# Patient Record
(demographics unavailable — no encounter records)

---

## 2024-10-10 NOTE — ADDENDUM
[FreeTextEntry1] : The patient is an 84-year-old gentleman who presents the office with complaints of severe low back pain, bilateral lower extremity pain with ambulation and significant spinal deformity.  The patient states that this has been ongoing for several years and has been getting progressively worse.  He has tried physical therapy with no significant relief of his symptoms.  He attempted to have a pain management procedure, however, once he stopped his aspirin he had multiple TIAs and the procedure could not be performed.  The patient has MRI and x-rays of his spine available for review today.  The patient is noted to have severe degenerative scoliosis and thoracolumbar kyphotic deformity.  He has multiple levels of significant central canal stenosis and neuroforaminal stenosis.  Overall, the patient has significant debilitating disease, however he is not a surgical candidate due to his significant medical issues.  I recommended that he continue with conservative management and pain control.  There is no need for further neurosurgical follow-up.

## 2024-10-10 NOTE — RESULTS/DATA
[FreeTextEntry1] : EXAM: [id number] - MR SPINE LUMBAR - ORDERED BY: JULIAN COBOS PROCEDURE DATE: 09/30/2024 INTERPRETATION: CLINICAL INDICATION: Back pain. TECHNIQUE: Multiplanar multisequence MRI of the lumbar spine was performed without the administration of intravenous contrast, according to standard protocol. COMPARISON: None. FINDINGS: ALIGNMENT: Pronounced thoracic scoliosis of the lumbar spine apex at L3 with rightward lateral listhesis of L3 upon L4 measuring about 1.2 cm. Straightening of the normal lumbar lordosis with a stepwise trace retrolisthesis from L1 to L3 and trace anterolisthesis of L4 upon L5. VERTEBRAE: Mild wedging of T11. Cavity in the superior endplate of L5. Schmorl's node in the superior endplate of L4. Extensive mixed degenerative endplate changes throughout the lower lumbar spine with edematous endplate changes most pronounced at L2-L3 and L3-L4.. DISCS: Severe disc space narrowing throughout the lumbar spine with relative sparing of L5-S1 and L1-L2 discs. CONUS MEDULLARIS AND CAUDA EQUINA: The conus medullaris terminates at L1-2. There is normal appearance of the conus medullaris and cauda equina. PARAVERTEBRAL SOFT TISSUES AND VISUALIZED RETROPERITONEUM: Bilateral simple appearing renal cysts. EVALUATION OF INDIVIDUAL LEVELS: L1-2: Disc bulge eccentric to the right retrolisthesis limiting flavum thickening facet joint hypertrophy causing mild canal stenosis and moderate bilateral foraminal narrowing. L2-3: Disc bulge and retrolisthesis with ligamentum flavum thickening and facet joint hypertrophy causing mild canal stenosis and moderate right severe left foraminal narrowing.  L3-4: Disc bulge and anterolisthesis with ligamentum flavum thickening and facet joint hypertrophy causing mild canal stenosis and moderate right severe left foraminal narrowing. L4-5: Disc bulge with pronounced right greater than left facet joint hypertrophy and ligamentum flavum thickening causing mild canal stenosis, severe right foraminal narrowing and mild left foraminal narrowing. L5-S1: Disc bulge and facet joint hypertrophy with mild foraminal stenosis bilaterally. LIMITED EVALUATION OF UPPER SACRUM AND SACROILIAC JOINTS: Unremarkable. IMPRESSION: 1. Severe dextro scoliosis of the lumbar spine apex at L3 with rightward lateral listhesis of L3 upon L4. Edematous degenerative endplate changes are most pronounced at L2-L3 and L3-L4. 2. No significant canal stenosis, sites of severe foraminal narrowing bilaterally at L2-L3 and L3-L4 on the left and L4-5 on the right.. 3. suspect hepatomegaly, consider correlation with ultrasound and liver function testing. --- End of Report ---

## 2024-10-10 NOTE — HISTORY OF PRESENT ILLNESS
[de-identified] : 85 y/o male with hx of 5 TIAs (on asprin) coming in for worsening lower back pain. Pain worse with walking and patient can ambulate about 3 blocks until pain becomes debilitating.  Manages pain with tramadol.  Lumbar MRI:  Severe dextro scoliosis of the lumbar spine apex at L3 with rightward lateral listhesis of L3 upon L4. Edematous degenerative endplate changes are most pronounced at L2-L3 and L3-L4.

## 2024-10-10 NOTE — HISTORY OF PRESENT ILLNESS
[de-identified] : 85 y/o male with hx of 5 TIAs (on asprin) coming in for worsening lower back pain. Pain worse with walking and patient can ambulate about 3 blocks until pain becomes debilitating.  Manages pain with tramadol.  Lumbar MRI:  Severe dextro scoliosis of the lumbar spine apex at L3 with rightward lateral listhesis of L3 upon L4. Edematous degenerative endplate changes are most pronounced at L2-L3 and L3-L4.

## 2024-10-10 NOTE — RESULTS/DATA
[FreeTextEntry1] : EXAM: [id number] - MR SPINE LUMBAR - ORDERED BY: JULIAN COBOS PROCEDURE DATE: 09/30/2024 INTERPRETATION: CLINICAL INDICATION: Back pain. TECHNIQUE: Multiplanar multisequence MRI of the lumbar spine was performed without the administration of intravenous contrast, according to standard protocol. COMPARISON: None. FINDINGS: ALIGNMENT: Pronounced thoracic scoliosis of the lumbar spine apex at L3 with rightward lateral listhesis of L3 upon L4 measuring about 1.2 cm. Straightening of the normal lumbar lordosis with a stepwise trace retrolisthesis from L1 to L3 and trace anterolisthesis of L4 upon L5. VERTEBRAE: Mild wedging of T11. Cavity in the superior endplate of L5. Schmorl's node in the superior endplate of L4. Extensive mixed degenerative endplate changes throughout the lower lumbar spine with edematous endplate changes most pronounced at L2-L3 and L3-L4.. DISCS: Severe disc space narrowing throughout the lumbar spine with relative sparing of L5-S1 and L1-L2 discs. CONUS MEDULLARIS AND CAUDA EQUINA: The conus medullaris terminates at L1-2. There is normal appearance of the conus medullaris and cauda equina. PARAVERTEBRAL SOFT TISSUES AND VISUALIZED RETROPERITONEUM: Bilateral simple appearing renal cysts. EVALUATION OF INDIVIDUAL LEVELS: L1-2: Disc bulge eccentric to the right retrolisthesis limiting flavum thickening facet joint hypertrophy causing mild canal stenosis and moderate bilateral foraminal narrowing. L2-3: Disc bulge and retrolisthesis with ligamentum flavum thickening and facet joint hypertrophy causing mild canal stenosis and moderate right severe left foraminal narrowing.  L3-4: Disc bulge and anterolisthesis with ligamentum flavum thickening and facet joint hypertrophy causing mild canal stenosis and moderate right severe left foraminal narrowing. L4-5: Disc bulge with pronounced right greater than left facet joint hypertrophy and ligamentum flavum thickening causing mild canal stenosis, severe right foraminal narrowing and mild left foraminal narrowing. L5-S1: Disc bulge and facet joint hypertrophy with mild foraminal stenosis bilaterally. LIMITED EVALUATION OF UPPER SACRUM AND SACROILIAC JOINTS: Unremarkable. IMPRESSION: 1. Severe dextro scoliosis of the lumbar spine apex at L3 with rightward lateral listhesis of L3 upon L4. Edematous degenerative endplate changes are most pronounced at L2-L3 and L3-L4. 2. No significant canal stenosis, sites of severe foraminal narrowing bilaterally at L2-L3 and L3-L4 on the left and L4-5 on the right.. 3. suspect hepatomegaly, consider correlation with ultrasound and liver function testing. --- End of Report ---

## 2024-10-10 NOTE — HISTORY OF PRESENT ILLNESS
[de-identified] : 83 y/o male with hx of 5 TIAs (on asprin) coming in for worsening lower back pain. Pain worse with walking and patient can ambulate about 3 blocks until pain becomes debilitating.  Manages pain with tramadol.  Lumbar MRI:  Severe dextro scoliosis of the lumbar spine apex at L3 with rightward lateral listhesis of L3 upon L4. Edematous degenerative endplate changes are most pronounced at L2-L3 and L3-L4.

## 2024-10-10 NOTE — RESULTS/DATA
[FreeTextEntry1] : EXAM: [id number] - MR SPINE LUMBAR - ORDERED BY: JULIAN COBOS PROCEDURE DATE: 09/30/2024 INTERPRETATION: CLINICAL INDICATION: Back pain. TECHNIQUE: Multiplanar multisequence MRI of the lumbar spine was performed without the administration of intravenous contrast, according to standard protocol. COMPARISON: None. FINDINGS: ALIGNMENT: Pronounced thoracic scoliosis of the lumbar spine apex at L3 with rightward lateral listhesis of L3 upon L4 measuring about 1.2 cm. Straightening of the normal lumbar lordosis with a stepwise trace retrolisthesis from L1 to L3 and trace anterolisthesis of L4 upon L5. VERTEBRAE: Mild wedging of T11. Cavity in the superior endplate of L5. Schmorl's node in the superior endplate of L4. Extensive mixed degenerative endplate changes throughout the lower lumbar spine with edematous endplate changes most pronounced at L2-L3 and L3-L4.. DISCS: Severe disc space narrowing throughout the lumbar spine with relative sparing of L5-S1 and L1-L2 discs. CONUS MEDULLARIS AND CAUDA EQUINA: The conus medullaris terminates at L1-2. There is normal appearance of the conus medullaris and cauda equina. PARAVERTEBRAL SOFT TISSUES AND VISUALIZED RETROPERITONEUM: Bilateral simple appearing renal cysts. EVALUATION OF INDIVIDUAL LEVELS: L1-2: Disc bulge eccentric to the right retrolisthesis limiting flavum thickening facet joint hypertrophy causing mild canal stenosis and moderate bilateral foraminal narrowing. L2-3: Disc bulge and retrolisthesis with ligamentum flavum thickening and facet joint hypertrophy causing mild canal stenosis and moderate right severe left foraminal narrowing.  L3-4: Disc bulge and anterolisthesis with ligamentum flavum thickening and facet joint hypertrophy causing mild canal stenosis and moderate right severe left foraminal narrowing. L4-5: Disc bulge with pronounced right greater than left facet joint hypertrophy and ligamentum flavum thickening causing mild canal stenosis, severe right foraminal narrowing and mild left foraminal narrowing. L5-S1: Disc bulge and facet joint hypertrophy with mild foraminal stenosis bilaterally. LIMITED EVALUATION OF UPPER SACRUM AND SACROILIAC JOINTS: Unremarkable. IMPRESSION: 1. Severe dextro scoliosis of the lumbar spine apex at L3 with rightward lateral listhesis of L3 upon L4. Edematous degenerative endplate changes are most pronounced at L2-L3 and L3-L4. 2. No significant canal stenosis, sites of severe foraminal narrowing bilaterally at L2-L3 and L3-L4 on the left and L4-5 on the right.. 3. suspect hepatomegaly, consider correlation with ultrasound and liver function testing. --- End of Report ---